# Patient Record
Sex: FEMALE | Race: WHITE | NOT HISPANIC OR LATINO | Employment: FULL TIME | URBAN - METROPOLITAN AREA
[De-identification: names, ages, dates, MRNs, and addresses within clinical notes are randomized per-mention and may not be internally consistent; named-entity substitution may affect disease eponyms.]

---

## 2022-05-27 PROBLEM — E78.5 HLD (HYPERLIPIDEMIA): Status: ACTIVE | Noted: 2022-05-27

## 2022-05-27 PROBLEM — S81.819A LEG LACERATION: Status: ACTIVE | Noted: 2022-05-27

## 2022-05-27 PROBLEM — E03.9 HYPOTHYROIDISM: Status: ACTIVE | Noted: 2022-05-27

## 2022-06-03 ENCOUNTER — NURSE TRIAGE (OUTPATIENT)
Dept: ADMINISTRATIVE | Facility: CLINIC | Age: 57
End: 2022-06-03

## 2022-06-03 NOTE — TELEPHONE ENCOUNTER
Right lower leg surgery, seeping, she is afraid it is getting infected. 19 stiches, was not sent home on abx. States she sent messages and photos to surgeon last night. Top part looks ok, bottom part not so much, some yellow seepage. Triage done- go to office now. Advised I could transfer scheduling and send message to provider, but if she did not hear from them soon, she would need to be seen, UC or ED. States she and her spouse were planning on heading to a quick care now. Instructed to call back for any further questions or concerns or worsening S/S Verb understanding.   Reason for Disposition   Pus or bad-smelling fluid draining from incision    Additional Information   Negative: Major abdominal surgical incision and wound gaping open with visible internal organs   Negative: Sounds like a life-threatening emergency to the triager   Negative: Bleeding from incision and won't stop after 10 minutes of direct pressure   Negative: Widespread rash and bright red, sunburn-like   Negative: Severe pain in the incision   Negative: Incision gaping open and < 2 days (48 hours) since wound re-opened   Negative: Incision gaping open and length of opening > 2 inches (5 cm)   Negative: Patient sounds very sick or weak to the triager   Negative: Sounds like a serious complication to the triager   Negative: Fever > 100.4 F (38.0 C)   Negative: Incision looks infected (spreading redness, pain)   Negative: Red streak runs from the incision and longer than 1 inch (2.5 cm)    Protocols used: POST-OP INCISION SYMPTOMS AND [id number]